# Patient Record
Sex: FEMALE | URBAN - METROPOLITAN AREA
[De-identification: names, ages, dates, MRNs, and addresses within clinical notes are randomized per-mention and may not be internally consistent; named-entity substitution may affect disease eponyms.]

---

## 2019-03-27 ENCOUNTER — OFFICE VISIT (OUTPATIENT)
Dept: URBAN - METROPOLITAN AREA CLINIC 58 | Facility: CLINIC | Age: 69
End: 2019-03-27
Payer: MEDICARE

## 2019-03-27 DIAGNOSIS — H40.023 OPEN ANGLE WITH BORDERLINE FINDINGS, HIGH RISK, BILATERAL: ICD-10-CM

## 2019-03-27 DIAGNOSIS — H43.813 VITREOUS DEGENERATION, BILATERAL: ICD-10-CM

## 2019-03-27 PROCEDURE — 92004 COMPRE OPH EXAM NEW PT 1/>: CPT | Performed by: OPTOMETRIST

## 2019-03-27 ASSESSMENT — INTRAOCULAR PRESSURE
OD: 23
OS: 22

## 2019-03-27 ASSESSMENT — KERATOMETRY
OS: 46.38
OD: 46.25

## 2019-03-27 ASSESSMENT — VISUAL ACUITY
OD: 20/50
OS: 20/40

## 2019-03-27 NOTE — IMPRESSION/PLAN
Impression: Age-related nuclear cataract, bilateral: H25.13. Plan: Diagnosis discussed in detail. Pt desires cataract surgery. Consultation with Tara Escobar DO for eval/treatment.

## 2019-03-27 NOTE — IMPRESSION/PLAN
Impression: Open angle with borderline findings, high risk, bilateral: H40.023. Plan: Diagnosis discussed in detail. Discussed treatment options. No treatment required at this time. Continue to observe pt's optic nerve and IOP. Discussed further eval after CEIOL due to elevated pressure and cataracts.

## 2019-04-16 ENCOUNTER — OFFICE VISIT (OUTPATIENT)
Dept: URBAN - METROPOLITAN AREA CLINIC 58 | Facility: CLINIC | Age: 69
End: 2019-04-16
Payer: MEDICARE

## 2019-04-16 DIAGNOSIS — H25.13 AGE-RELATED NUCLEAR CATARACT, BILATERAL: Primary | ICD-10-CM

## 2019-04-16 PROCEDURE — 92014 COMPRE OPH EXAM EST PT 1/>: CPT | Performed by: OPHTHALMOLOGY

## 2019-04-16 PROCEDURE — W9997 IOL SELECTION: HCPCS | Performed by: OPHTHALMOLOGY

## 2019-04-16 PROCEDURE — 92136 OPHTHALMIC BIOMETRY: CPT | Performed by: OPHTHALMOLOGY

## 2019-04-16 PROCEDURE — 92004 COMPRE OPH EXAM NEW PT 1/>: CPT | Performed by: OPHTHALMOLOGY

## 2019-04-16 RX ORDER — OFLOXACIN 3 MG/ML
0.3 % SOLUTION/ DROPS OPHTHALMIC
Qty: 1 | Refills: 1 | Status: INACTIVE
Start: 2019-04-16 | End: 2019-05-28

## 2019-04-16 RX ORDER — PREDNISOLONE ACETATE 10 MG/ML
1 % SUSPENSION/ DROPS OPHTHALMIC
Qty: 1 | Refills: 1 | Status: INACTIVE
Start: 2019-04-16 | End: 2019-05-22

## 2019-04-16 RX ORDER — TIMOLOL MALEATE 5 MG/ML
0.5 % SOLUTION/ DROPS OPHTHALMIC
Qty: 1 | Refills: 1 | Status: INACTIVE
Start: 2019-04-16 | End: 2019-05-08

## 2019-04-16 ASSESSMENT — INTRAOCULAR PRESSURE
OD: 25
OS: 25

## 2019-04-16 ASSESSMENT — VISUAL ACUITY
OS: 20/40
OD: 20/50

## 2019-04-16 ASSESSMENT — KERATOMETRY
OS: 46.00
OD: 46.13

## 2019-04-16 NOTE — IMPRESSION/PLAN
Impression: Age-related nuclear cataract, bilateral: H25.13. Plan: Cataracts account for the patient's complaints. Discussed all risks, benefits, procedures and recovery for cataract surgery in detail with the patient. Patient understands changing glasses will not improve vision. Patient desires to have surgery, recommend phacoemulsification with intraocular lens implant. Discussed lens implant options. Recommend standard lens with a NEAR -2.00 refractive goal. Discussed that glasses will still be needed at least for distance after cataract surgery and that no guarantee of refractive result is given.

## 2019-04-16 NOTE — IMPRESSION/PLAN
Impression: Open angle with borderline findings, high risk, bilateral: H40.023. Plan: Start Timolol qAM OU. Further evaluation is needed after CEIOL.

## 2019-05-07 ENCOUNTER — SURGERY (OUTPATIENT)
Dept: URBAN - METROPOLITAN AREA SURGERY 32 | Facility: SURGERY | Age: 69
End: 2019-05-07
Payer: MEDICARE

## 2019-05-07 PROCEDURE — 66984 XCAPSL CTRC RMVL W/O ECP: CPT | Performed by: OPHTHALMOLOGY

## 2019-05-08 ENCOUNTER — POST-OPERATIVE VISIT (OUTPATIENT)
Dept: URBAN - METROPOLITAN AREA CLINIC 58 | Facility: CLINIC | Age: 69
End: 2019-05-08

## 2019-05-08 DIAGNOSIS — Z09 ENCNTR FOR F/U EXAM AFT TRTMT FOR COND OTH THAN MALIG NEOPLM: Primary | ICD-10-CM

## 2019-05-08 PROCEDURE — 99024 POSTOP FOLLOW-UP VISIT: CPT | Performed by: OPTOMETRIST

## 2019-05-08 RX ORDER — BRIMONIDINE TARTRATE 2 MG/ML
0.2 % SOLUTION/ DROPS OPHTHALMIC
Qty: 5 | Refills: 0 | Status: INACTIVE
Start: 2019-05-08 | End: 2019-06-03

## 2019-05-08 ASSESSMENT — INTRAOCULAR PRESSURE
OS: 44
OS: 27
OS: 34
OD: 33
OD: 30

## 2019-05-15 ENCOUNTER — POST-OPERATIVE VISIT (OUTPATIENT)
Dept: URBAN - METROPOLITAN AREA CLINIC 58 | Facility: CLINIC | Age: 69
End: 2019-05-15

## 2019-05-15 PROCEDURE — 99024 POSTOP FOLLOW-UP VISIT: CPT | Performed by: OPTOMETRIST

## 2019-05-15 ASSESSMENT — VISUAL ACUITY
OD: 20/40
OS: 20/20

## 2019-05-15 ASSESSMENT — INTRAOCULAR PRESSURE
OS: 22
OD: 26

## 2019-05-28 ENCOUNTER — SURGERY (OUTPATIENT)
Dept: URBAN - METROPOLITAN AREA SURGERY 32 | Facility: SURGERY | Age: 69
End: 2019-05-28
Payer: MEDICARE

## 2019-05-28 PROCEDURE — 66984 XCAPSL CTRC RMVL W/O ECP: CPT | Performed by: OPHTHALMOLOGY

## 2019-05-29 ENCOUNTER — POST-OPERATIVE VISIT (OUTPATIENT)
Dept: URBAN - METROPOLITAN AREA CLINIC 58 | Facility: CLINIC | Age: 69
End: 2019-05-29

## 2019-05-29 PROCEDURE — 99024 POSTOP FOLLOW-UP VISIT: CPT | Performed by: OPTOMETRIST

## 2019-05-29 ASSESSMENT — INTRAOCULAR PRESSURE
OS: 33
OD: 36

## 2019-06-03 ENCOUNTER — POST-OPERATIVE VISIT (OUTPATIENT)
Dept: URBAN - METROPOLITAN AREA CLINIC 58 | Facility: CLINIC | Age: 69
End: 2019-06-03

## 2019-06-03 PROCEDURE — 99024 POSTOP FOLLOW-UP VISIT: CPT | Performed by: OPHTHALMOLOGY

## 2019-06-03 ASSESSMENT — VISUAL ACUITY
OD: 20/25
OS: 20/20

## 2019-06-03 ASSESSMENT — INTRAOCULAR PRESSURE
OD: 12
OS: 13

## 2019-06-26 ENCOUNTER — POST-OPERATIVE VISIT (OUTPATIENT)
Dept: URBAN - METROPOLITAN AREA CLINIC 58 | Facility: CLINIC | Age: 69
End: 2019-06-26

## 2019-06-26 PROCEDURE — 99024 POSTOP FOLLOW-UP VISIT: CPT | Performed by: OPTOMETRIST

## 2019-06-26 RX ORDER — PREDNISOLONE ACETATE 10 MG/ML
1 % SUSPENSION/ DROPS OPHTHALMIC
Qty: 1 | Refills: 0 | Status: INACTIVE
Start: 2019-06-26 | End: 2022-04-01

## 2019-06-26 ASSESSMENT — INTRAOCULAR PRESSURE
OD: 15
OS: 16

## 2019-06-26 ASSESSMENT — VISUAL ACUITY
OD: 20/30
OS: 20/20

## 2019-07-19 ENCOUNTER — POST-OPERATIVE VISIT (OUTPATIENT)
Dept: URBAN - METROPOLITAN AREA CLINIC 58 | Facility: CLINIC | Age: 69
End: 2019-07-19

## 2019-07-19 PROCEDURE — 99024 POSTOP FOLLOW-UP VISIT: CPT | Performed by: OPTOMETRIST

## 2019-07-19 ASSESSMENT — VISUAL ACUITY
OS: 20/20
OD: 20/25

## 2022-04-01 ENCOUNTER — OFFICE VISIT (OUTPATIENT)
Dept: URBAN - METROPOLITAN AREA CLINIC 58 | Facility: CLINIC | Age: 72
End: 2022-04-01
Payer: MEDICARE

## 2022-04-01 DIAGNOSIS — H26.493 OTHER SECONDARY CATARACT, BILATERAL: Primary | ICD-10-CM

## 2022-04-01 PROCEDURE — 99214 OFFICE O/P EST MOD 30 MIN: CPT | Performed by: OPTOMETRIST

## 2022-04-01 ASSESSMENT — VISUAL ACUITY
OS: 20/25
OD: 20/50

## 2022-04-01 ASSESSMENT — INTRAOCULAR PRESSURE
OS: 21
OD: 22

## 2022-04-01 NOTE — IMPRESSION/PLAN
Impression: Open angle with borderline findings, high risk, bilateral: H40.023. Plan: Further evaluation after YAG.

## 2022-04-01 NOTE — IMPRESSION/PLAN
Impression: Other secondary cataract, bilateral: H26.493. Plan: Discussed diagnosis in detail with patient. Advised patient of condition. Recommend YAG Capsulotomy. Risks and benefits explained and understood by patient. Patient elects to proceed with YAG Capsulotomy.

## 2022-04-19 ENCOUNTER — SURGERY (OUTPATIENT)
Dept: URBAN - METROPOLITAN AREA SURGERY 32 | Facility: SURGERY | Age: 72
End: 2022-04-19
Payer: MEDICARE

## 2022-04-19 PROCEDURE — 66821 AFTER CATARACT LASER SURGERY: CPT | Performed by: OPHTHALMOLOGY

## 2022-04-27 ENCOUNTER — POST-OPERATIVE VISIT (OUTPATIENT)
Dept: URBAN - METROPOLITAN AREA CLINIC 58 | Facility: CLINIC | Age: 72
End: 2022-04-27
Payer: MEDICARE

## 2022-04-27 DIAGNOSIS — Z48.810 ENCOUNTER FOR SURGICAL AFTERCARE FOLLOWING SURGERY ON A SENSE ORGAN: Primary | ICD-10-CM

## 2022-04-27 PROCEDURE — 99024 POSTOP FOLLOW-UP VISIT: CPT | Performed by: OPTOMETRIST

## 2022-04-27 ASSESSMENT — INTRAOCULAR PRESSURE
OS: 18
OD: 23

## 2022-04-27 ASSESSMENT — VISUAL ACUITY: OD: 20/30

## 2022-04-27 NOTE — IMPRESSION/PLAN
Impression: S/P YAG Capsulotomy (Yttrium Aluminum [place]) OD - 8 Days. Encounter for surgical aftercare following surgery on a sense organ  Z48.810. Excellent post op course   Condition is improving - Plan: 4 months DE, OCT ON and pachy. Pt is thinking about getting glasses, but isn't sure about it at this time.

## 2024-04-12 ENCOUNTER — OFFICE VISIT (OUTPATIENT)
Dept: URBAN - METROPOLITAN AREA CLINIC 58 | Facility: CLINIC | Age: 74
End: 2024-04-12
Payer: MEDICARE

## 2024-04-12 DIAGNOSIS — H26.492 OTHER SECONDARY CATARACT, LEFT EYE: Primary | ICD-10-CM

## 2024-04-12 DIAGNOSIS — H40.1133 PRIMARY OPEN-ANGLE GLAUCOMA, SEVERE STAGE, BILATERAL: ICD-10-CM

## 2024-04-12 PROCEDURE — 99214 OFFICE O/P EST MOD 30 MIN: CPT | Performed by: OPTOMETRIST

## 2024-04-12 PROCEDURE — 76514 ECHO EXAM OF EYE THICKNESS: CPT | Performed by: OPTOMETRIST

## 2024-04-12 PROCEDURE — 92133 CPTRZD OPH DX IMG PST SGM ON: CPT | Performed by: OPTOMETRIST

## 2024-04-12 ASSESSMENT — KERATOMETRY
OS: 46.13
OD: 46.13

## 2024-04-12 ASSESSMENT — VISUAL ACUITY
OS: 20/25
OD: 20/25

## 2024-04-12 ASSESSMENT — INTRAOCULAR PRESSURE
OD: 20
OS: 21